# Patient Record
Sex: FEMALE | Race: WHITE
[De-identification: names, ages, dates, MRNs, and addresses within clinical notes are randomized per-mention and may not be internally consistent; named-entity substitution may affect disease eponyms.]

---

## 2017-05-28 ENCOUNTER — HOSPITAL ENCOUNTER (EMERGENCY)
Dept: HOSPITAL 33 - ED | Age: 67
Discharge: HOME | End: 2017-05-28
Payer: MEDICARE

## 2017-05-28 VITALS — SYSTOLIC BLOOD PRESSURE: 135 MMHG | OXYGEN SATURATION: 96 % | DIASTOLIC BLOOD PRESSURE: 69 MMHG

## 2017-05-28 VITALS — HEART RATE: 70 BPM

## 2017-05-28 DIAGNOSIS — T14.8: Primary | ICD-10-CM

## 2017-05-28 LAB
ANION GAP SERPL CALC-SCNC: 12.2 MEQ/L (ref 5–15)
APTT PPP: 33.2 SECONDS (ref 25.3–37)
BASOPHILS NFR BLD AUTO: 0.4 % (ref 0–0.4)
BUN SERPL-MCNC: 19 MG/DL (ref 9–20)
CHLORIDE SERPL-SCNC: 109 MEQ/L (ref 98–107)
CO2 SERPL-SCNC: 26.6 MEQ/L (ref 21–32)
GLUCOSE SERPL-MCNC: 97 MG/DL (ref 70–110)
INR PPP: 0.97 (ref 0.8–3)
MCH RBC QN AUTO: 30.2 PG (ref 26–32)
NEUTROPHILS NFR BLD AUTO: 57.9 % (ref 36–66)
PLATELET # BLD AUTO: 227 K/MM3 (ref 150–450)
POTASSIUM SERPLBLD-SCNC: 4.4 MEQ/L (ref 3.5–5.1)
PROTHROMBIN TIME: 10.9 SECONDS (ref 9.95–12.35)
RBC # BLD AUTO: 4.1 M/MM3 (ref 4.1–5.4)
SODIUM SERPL-SCNC: 143 MEQ/L (ref 136–145)
WBC # BLD AUTO: 5.7 K/MM3 (ref 4–10.5)

## 2017-05-28 PROCEDURE — 36415 COLL VENOUS BLD VENIPUNCTURE: CPT

## 2017-05-28 PROCEDURE — 85025 COMPLETE CBC W/AUTO DIFF WBC: CPT

## 2017-05-28 PROCEDURE — 85610 PROTHROMBIN TIME: CPT

## 2017-05-28 PROCEDURE — 99283 EMERGENCY DEPT VISIT LOW MDM: CPT

## 2017-05-28 PROCEDURE — 80048 BASIC METABOLIC PNL TOTAL CA: CPT

## 2017-05-28 PROCEDURE — 85730 THROMBOPLASTIN TIME PARTIAL: CPT

## 2017-05-28 NOTE — ERPHSYRPT
- History of Present Illness


Time Seen by Provider: 05/28/17 09:29


Exam Limitations: no limitations


Patient Subjective Stated Complaint: while making bed this am noticed a bruise 

on right wrist.  does not remember hitting wrist on anything.


Triage Nursing Assessment: ambulated to room per self without difficulty.  skin 

w/d, color normal.  large hematoma to right wrist.  tender to touch.  pulse good

, hand warm and normal color.


Physician History: 





The patient is a right-handed 66-year-old female with her  complaining 

of pain in her right wrist that began suddenly while she was making the bed 

this morning.  She was pulling on that sheet and felt a sudden pop in her right 

wrist and has noticed a large bruise/hematoma on her wrist.  This is now tender 

and swollen.  She doesn't take any blood thinners.  She's never had any 

bleeding or clotting problems in the past.  Her past medical history 

significant only for high cholesterol and a hysterectomy.


Occurred: just prior to arrival


Method of Injury: other (making the bed)


Quality: sharpness


Severity of Pain-Max: mild


Severity of Pain-Current: mild


Extremities Pain Location: wrist: right


Modifying Factors: Improves With: nothing


Associated Symptoms: none


Allergies/Adverse Reactions: 








Sulfa (Sulfonamide Antibiotics) Allergy (Verified 05/28/17 09:01)


 


codeine Adverse Reaction (Verified 05/28/17 09:01)


 





Home Medications: 








Atorvastatin Calcium [Lipitor] 10 mg PO HS 05/28/17 [History]


Zinc 50 mg PO HS 05/28/17 [History]





Hx Tetanus, Diphtheria Vaccination/Date Given: No


Hx Influenza Vaccination/Date Given: No


Hx Pneumococcal Vaccination/Date Given: No





- Review of Systems


Constitutional: No Fever, No Chills


Eyes: No Symptoms


Ears, Nose, & Throat: No Symptoms


Respiratory: No Cough, No Dyspnea


Cardiac: No Chest Pain, No Edema, No Syncope


Abdominal/Gastrointestinal: No Abdominal Pain, No Nausea, No Vomiting, No 

Diarrhea


Genitourinary Symptoms: No Dysuria


Musculoskeletal: No Back Pain, No Neck Pain


Skin: Other (bruise/hematoma)


Neurological: No Dizziness, No Focal Weakness, No Sensory Changes


Psychological: No Symptoms


Endocrine: No Symptoms


Hematologic/Lymphatic: No Blood Clots, No Easy Bleeding, No Easy Bruising


Immunological/Allergic: No Symptoms


All Other Systems: Reviewed and Negative





- Past Medical History


Pertinent Past Medical History: Yes


Cardiac History: High Cholesterol


Musculoskeletal History: Fibromyalgia





- Past Surgical History


Past Surgical History: Yes


Female Surgical History: Hysterectomy





- Social History


Smoking Status: Never smoker


Exposure to second hand smoke: No


Drug Use: none


Patient Lives Alone: No





- Nursing Vital Signs


Nursing Vital Signs: 


 Initial Vital Signs











Temperature                    99.3 F


 


Temperature Source             Oral


 


Pulse Rate                     70


 


Respiratory Rate               18


 


Blood Pressure [Left Arm]      135/69


 


Pain Intensity                 4

















- Physical Exam


General Appearance: alert


Eyes, Ears, Nose, Throat Exam: moist mucous membranes


Neck Exam: non-tender, supple


Cardiovascular/Respiratory Exam: chest non-tender, normal breath sounds, 

regular rate/rhythm, no respiratory distress


Abdominal Exam: non-tender, No guarding


Back Exam: normal inspection, No vertebral tenderness


Shoulder Exam: normal inspection


Elbow/Forearm Exam: normal inspection


Wrist Exam: ecchymosis (Examination of the right wrist reveals a large hematoma 

over the palmar side of the wrist.  This hematoma has produced mild swelling 

and tenderness.  The patient moves the wrist without difficulty.)


Hand Exam: normal inspection


Neuro/Tendon Exam: normal sensation, normal motor functions


Mental Status Exam: alert, oriented x 3, cooperative


Skin Exam: normal color, warm, dry


**SpO2 Interpretation**: normal


SpO2: 96


Oxygen Delivery: Room Air


Ordered Tests: 


 Active Orders 24 hr











 Category Date Time Status


 


 BMP Stat Lab  05/28/17 09:48 Completed


 


 CBC W DIFF Stat Lab  05/28/17 09:48 Completed


 


 PROTIME WITH INR Stat Lab  05/28/17 09:48 Completed


 


 PTT Stat Lab  05/28/17 09:48 Completed











Lab/Rad Data: 


 Laboratory Result [facility]





 05/28/17 09:48 





 05/28/17 09:48 





 Laboratory Results











  05/28/17 05/28/17 05/28/17 Range/Units





  09:48 09:48 09:48 


 


WBC    5.7  (4.0-10.5)  K/mm3


 


RBC    4.10  (4.1-5.4)  M/mm3


 


Hgb    12.4  (12.0-16.0)  gm/dl


 


Hct    38.9  (35-47)  %


 


MCV    94.9  ()  fl


 


MCH    30.2  (26-32)  pg


 


MCHC    31.9 L  (32-36)  g/dl


 


RDW    13.5  (11.5-14.0)  %


 


Plt Count    227  (150-450)  K/mm3


 


MPV    10.3 H  (6-9.5)  fl


 


Gran %    57.9  (36.0-66.0)  %


 


Lymphocytes %    29.3  (24.0-44.0)  %


 


Monocytes %    9.9  (0.0-12.0)  %


 


Eosinophils %    2.5  (0.00-5.0)  %


 


Basophils %    0.4  (0.0-0.4)  %


 


Basophils #    0.02  (0-0.4)  


 


INR  0.97    (0.8-3.0)  


 


APTT  33.2    (25.3-37.0)  SECONDS


 


Sodium   143   (136-145)  mEq/L


 


Potassium   4.4   (3.5-5.1)  mEq/L


 


Chloride   109 H   ()  mEq/L


 


Carbon Dioxide   26.6   (21-32)  mEq/L


 


Anion Gap   12.2   (5-15)  MEQ/L


 


BUN   19   (9-20)  mg/dL


 


Creatinine   0.97   (0.55-1.30)  mg/dl


 


Estimated GFR   > 60   ML/MIN


 


Glucose   97   ()  MG/DL


 


Calcium   9.3   (8.5-10.1)  mg/dL














- Progress


Progress: unchanged


Counseled pt/family regarding: lab results, diagnosis





- Departure


Time of Disposition: 10:24


Departure Disposition: Home


Clinical Impression: 


 Hematoma





Condition: Stable


Critical Care Time: No


Additional Instructions: 


You have a hematoma on your right wrist.  All laboratory tests were within 

normal limits.  Apply ice to the area today as needed.  If condition worsens, 

please return to the ER or follow-up with your family physician.

## 2021-12-02 ENCOUNTER — HOSPITAL ENCOUNTER (EMERGENCY)
Dept: HOSPITAL 33 - ED | Age: 71
Discharge: LEFT BEFORE DISCHARGE | End: 2021-12-02
Payer: MEDICARE

## 2021-12-02 VITALS — HEART RATE: 64 BPM | OXYGEN SATURATION: 98 % | SYSTOLIC BLOOD PRESSURE: 145 MMHG | DIASTOLIC BLOOD PRESSURE: 79 MMHG

## 2021-12-02 DIAGNOSIS — M54.2: ICD-10-CM

## 2021-12-02 DIAGNOSIS — E78.5: ICD-10-CM

## 2021-12-02 DIAGNOSIS — I24.9: Primary | ICD-10-CM

## 2021-12-02 DIAGNOSIS — M79.602: ICD-10-CM

## 2021-12-02 LAB
ALBUMIN SERPL-MCNC: 3.8 G/DL (ref 3.5–5)
ALP SERPL-CCNC: 77 U/L (ref 38–126)
ALT SERPL-CCNC: 24 U/L (ref 0–35)
ANION GAP SERPL CALC-SCNC: 9.6 MEQ/L (ref 5–15)
AST SERPL QL: 27 U/L (ref 14–36)
BASOPHILS # BLD AUTO: 0.04 10*3/UL (ref 0–0.4)
BASOPHILS NFR BLD AUTO: 0.6 % (ref 0–0.4)
BILIRUB BLD-MCNC: 0.4 MG/DL (ref 0.2–1.3)
BNP SERPL-MCNC: 114 PG/ML (ref 0–900)
BUN SERPL-MCNC: 22 MG/DL (ref 7–17)
CALCIUM SPEC-MCNC: 9.3 MG/DL (ref 8.4–10.2)
CHLORIDE SERPL-SCNC: 107 MMOL/L (ref 98–107)
CO2 SERPL-SCNC: 26 MMOL/L (ref 22–30)
CREAT SERPL-MCNC: 0.85 MG/DL (ref 0.52–1.04)
EOSINOPHIL # BLD AUTO: 0.22 10*3/UL (ref 0–0.5)
GFR SERPLBLD BASED ON 1.73 SQ M-ARVRAT: > 60 ML/MIN
GLUCOSE SERPL-MCNC: 103 MG/DL (ref 74–106)
GLUCOSE UR-MCNC: NEGATIVE MG/DL
HCT VFR BLD AUTO: 39.7 % (ref 35–47)
HGB BLD-MCNC: 12.5 GM/DL (ref 12–16)
LYMPHOCYTES # SPEC AUTO: 2 10*3/UL (ref 1–4.6)
MAGNESIUM SERPL-MCNC: 2.2 MG/DL (ref 1.6–2.3)
MCH RBC QN AUTO: 30.3 PG (ref 26–32)
MCHC RBC AUTO-ENTMCNC: 31.5 G/DL (ref 32–36)
MONOCYTES # BLD AUTO: 0.89 10*3/UL (ref 0–1.3)
PLATELET # BLD AUTO: 228 K/MM3 (ref 150–450)
POTASSIUM SERPLBLD-SCNC: 4.1 MMOL/L (ref 3.5–5.1)
PROT SERPL-MCNC: 6.4 G/DL (ref 6.3–8.2)
PROT UR STRIP-MCNC: NEGATIVE MG/DL
RBC # BLD AUTO: 4.13 M/MM3 (ref 4.1–5.4)
RBC #/AREA URNS HPF: (no result) /HPF (ref 0–2)
SODIUM SERPL-SCNC: 138 MMOL/L (ref 137–145)
WBC # BLD AUTO: 6.9 K/MM3 (ref 4–10.5)
WBC #/AREA URNS HPF: (no result) /HPF (ref 0–5)

## 2021-12-02 PROCEDURE — 99284 EMERGENCY DEPT VISIT MOD MDM: CPT

## 2021-12-02 PROCEDURE — 83880 ASSAY OF NATRIURETIC PEPTIDE: CPT

## 2021-12-02 PROCEDURE — 83735 ASSAY OF MAGNESIUM: CPT

## 2021-12-02 PROCEDURE — 93041 RHYTHM ECG TRACING: CPT

## 2021-12-02 PROCEDURE — 36415 COLL VENOUS BLD VENIPUNCTURE: CPT

## 2021-12-02 PROCEDURE — 94760 N-INVAS EAR/PLS OXIMETRY 1: CPT

## 2021-12-02 PROCEDURE — 93005 ELECTROCARDIOGRAM TRACING: CPT

## 2021-12-02 PROCEDURE — 82947 ASSAY GLUCOSE BLOOD QUANT: CPT

## 2021-12-02 PROCEDURE — 80053 COMPREHEN METABOLIC PANEL: CPT

## 2021-12-02 PROCEDURE — 36000 PLACE NEEDLE IN VEIN: CPT

## 2021-12-02 PROCEDURE — 81001 URINALYSIS AUTO W/SCOPE: CPT

## 2021-12-02 PROCEDURE — 84484 ASSAY OF TROPONIN QUANT: CPT

## 2021-12-02 PROCEDURE — 85025 COMPLETE CBC W/AUTO DIFF WBC: CPT

## 2021-12-02 NOTE — ERPHSYRPT
- History of Present Illness


Time Seen by Provider: 12/02/21 18:00


Source: patient


Exam Limitations: no limitations


Patient Subjective Stated Complaint: PT HERE FOR WEAKNESS SUDDEN ONSET TODAY, 

SHE ASLO STATES ALL DAY SHE HAS BEEN HAVING SHOOTING PAIN UP NECK AND DOWN LEFT 

ARM, STATES PAIN COMES AND GOES,NO INJURY


Triage Nursing Assessment: PT ALERT, RESP EASY, SKIN W/D/P. FACE MASK IN PLACE, 

ABLE TO UNDRESS SELF AND WALKED TO BED, NO EDEMA NOTED


Physician History: 


Patient is a 71-year-old female presents to our ED with complaints of sudden 

onset generalized weakness.  Patient states she was at home when her symptoms 

started.  Patient then felt pain in her neck and into her left arm.  Patient 

thought she was having a heart attack and came to our emergency department.  

Upon arrival to our ED patient symptoms had improved.  Patient states her 

symptoms have been intermittent throughout the day.  Symptoms are mild to 

moderate in intensity.  Exertion worsens symptoms.  Pain improved with rest.  No

associated nausea vomiting or diaphoresis.  Patient states she has had the 

symptoms in the past but were more intense today.  Patient voices no other 

complaints or concerns at this time.





Timing/Duration: today


Severity: moderate


Modifying Factors: Improves With: movement


Associated Symptoms: denies symptoms


Allergies/Adverse Reactions: 








Sulfa (Sulfonamide Antibiotics) Allergy (Verified 12/02/21 18:01)


   


codeine Adverse Reaction (Verified 12/02/21 18:01)


   





Home Medications: 








Atorvastatin Calcium [Lipitor] 10 mg PO HS 05/28/17 [History]


Zinc 50 mg PO HS 05/28/17 [History]





Hx Tetanus, Diphtheria Vaccination/Date Given: No


Hx Influenza Vaccination/Date Given: No


Hx Pneumococcal Vaccination/Date Given: No


Immunizations Up to Date: Yes





Travel Risk





- International Travel


Have you traveled outside of the country in past 3 weeks: No





- Coronavirus Screening


Are you exhibiting any of the following symptoms?: No


Close contact with a COVID-19 positive Pt in past 14-21 Days: No





- Vaccine Status


Have you recieved a Covid-19 vaccination: No





- Review of Systems


Constitutional: No Symptoms, No Fever, No Chills


Eyes: No Symptoms


Ears, Nose, & Throat: No Symptoms


Respiratory: No Symptoms, No Cough, No Dyspnea


Cardiac: No Symptoms, No Chest Pain, No Edema, No Syncope


Abdominal/Gastrointestinal: No Symptoms, No Abdominal Pain, No Nausea, No 

Vomiting, No Diarrhea


Genitourinary Symptoms: No Symptoms, No Dysuria


Musculoskeletal: No Symptoms, No Back Pain, No Neck Pain


Skin: No Symptoms, No Rash


Neurological: No Symptoms, No Dizziness, No Focal Weakness, No Sensory Changes


Psychological: No Symptoms


Endocrine: No Symptoms


Hematologic/Lymphatic: No Symptoms


Immunological/Allergic: No Symptoms


All Other Systems: Reviewed and Negative





- Past Medical History


Pertinent Past Medical History: Yes


Cardiac History: High Cholesterol


Musculoskeletal History: Fibromyalgia





- Past Surgical History


Past Surgical History: Yes


Female Surgical History: Hysterectomy





- Social History


Smoking Status: Never smoker


Exposure to second hand smoke: No


Drug Use: none


Patient Lives Alone: No





- Female History


Hx Last Menstrual Period: POST


Hx Pregnant Now: No





- Nursing Vital Signs


Nursing Vital Signs: 


                               Initial Vital Signs











Temperature  96.8 F   12/02/21 17:54


 


Pulse Rate  60   12/02/21 17:54


 


Respiratory Rate  96 H  12/02/21 17:54


 


Blood Pressure  182/93   12/02/21 17:54


 


O2 Sat by Pulse Oximetry  99   12/02/21 17:54








                                   Pain Scale











Pain Intensity                 4

















- Physical Exam


General Appearance: no apparent distress, alert


Eye Exam: PERRL/EOMI, eyes nml inspection


Ears, Nose, Throat Exam: normal ENT inspection, TMs normal, pharynx normal, 

moist mucous membranes


Neck Exam: normal inspection, non-tender, supple, full range of motion


Respiratory Exam: normal breath sounds, lungs clear, airway intact, No 

respiratory distress


Cardiovascular Exam: regular rate/rhythm, normal heart sounds, normal peripheral

 pulses


Gastrointestinal/Abdomen Exam: soft, normal bowel sounds, No tenderness, No mass


Back Exam: normal inspection, normal range of motion, No CVA tenderness, No 

vertebral tenderness


Extremity Exam: normal inspection, normal range of motion, pelvis stable


Neurologic Exam: alert, oriented x 3, cooperative, normal mood/affect, nml 

cerebellar function, nml station & gait, sensation nml, No motor deficits


Skin Exam: normal color, warm, dry, No rash


Lymphatic Exam: No adenopathy


**SpO2 Interpretation**: normal


SpO2: 100


O2 Delivery: Room Air





- Course


Nursing assessment & vital signs reviewed: Yes


EKG Interpreted by Me: RATE (53), Sinus Rhythm, NORMAL AXIS, NORMAL INTERVALS


Ordered Tests: 


                               Active Orders 24 hr











 Category Date Time Status


 


 AMA [Release AMA] OM.NOW Care  12/02/21 20:30 Ordered


 


 Cardiac Monitor STAT Care  12/02/21 18:11 Active


 


 EKG-ER Only STAT Care  12/02/21 18:10 Active


 


 IV Insertion STAT Care  12/02/21 18:10 Active


 


 Pulse Oximetry (ED) STAT Care  12/02/21 18:10 Active


 


 CBC W DIFF Stat Lab  12/02/21 18:26 Completed


 


 CMP Stat Lab  12/02/21 18:26 Completed


 


 MAGNESIUM Stat Lab  12/02/21 18:26 Completed


 


 NT PRO BNP Stat Lab  12/02/21 18:26 Completed


 


 POCT GLUCOSE Stat Lab  12/02/21 18:12 Completed


 


 TROPONIN Q3H Lab  12/02/21 18:26 Completed


 


 TROPONIN Q3H Lab  12/02/21 21:15 Ordered


 


 TROPONIN Q3H Lab  12/03/21 00:15 Ordered


 


 TROPONIN Q3H Lab  12/03/21 03:15 Ordered


 


 TROPONIN Q3H Lab  12/03/21 06:15 Ordered


 


 UA W/RFX UR CULTURE Stat Lab  12/02/21 18:14 Completed











Lab/Rad Data: 


                           Laboratory Result Diagrams





                                 12/02/21 18:26 





                                 12/02/21 18:26 





                               Laboratory Results











  12/02/21 12/02/21 12/02/21 Range/Units





  18:26 18:26 18:26 


 


WBC    6.9  (4.0-10.5)  K/mm3


 


RBC    4.13  (4.1-5.4)  M/mm3


 


Hgb    12.5  (12.0-16.0)  gm/dl


 


Hct    39.7  (35-47)  %


 


MCV    96.1  ()  fl


 


MCH    30.3  (26-32)  pg


 


MCHC    31.5 L  (32-36)  g/dl


 


RDW    13.1  (11.5-14.0)  %


 


Plt Count    228  (150-450)  K/mm3


 


MPV    10.2  (7.5-11.0)  fl


 


Gran %    54.4  (36.0-66.0)  %


 


Eos # (Auto)    0.22  (0-0.5)  


 


Absolute Lymphs (auto)    2.00  (1.0-4.6)  


 


Absolute Monos (auto)    0.89  (0.0-1.3)  


 


Lymphocytes %    28.9  (24.0-44.0)  %


 


Monocytes %    12.9 H  (0.0-12.0)  %


 


Eosinophils %    3.2  (0.00-5.0)  %


 


Basophils %    0.6  (0.0-0.4)  %


 


Absolute Granulocytes    3.76  (1.4-6.9)  


 


Basophils #    0.04  (0-0.4)  


 


Sodium   138   (137-145)  mmol/L


 


Potassium   4.1   (3.5-5.1)  mmol/L


 


Chloride   107   ()  mmol/L


 


Carbon Dioxide   26   (22-30)  mmol/L


 


Anion Gap   9.6   (5-15)  MEQ/L


 


BUN   22 H   (7-17)  mg/dL


 


Creatinine   0.85   (0.52-1.04)  mg/dL


 


Estimated GFR   > 60.0   ML/MIN


 


Glucose   103   ()  mg/dL


 


POC Glucometer     (74 to 106)  mg/dL


 


Calcium   9.3   (8.4-10.2)  mg/dL


 


Magnesium   2.2   (1.6-2.3)  mg/dL


 


Total Bilirubin   0.40   (0.2-1.3)  mg/dL


 


AST   27   (14-36)  U/L


 


ALT   24   (0-35)  U/L


 


Alkaline Phosphatase   77   ()  U/L


 


Troponin I  < 0.012    (0.000-0.034)  ng/mL


 


NT-Pro-B Natriuret Pep   114   (0-900)  pg/mL


 


Serum Total Protein   6.4   (6.3-8.2)  g/dL


 


Albumin   3.8   (3.5-5.0)  g/dL


 


Urine Color     (YELLOW)  


 


Urine Appearance     (CLEAR)  


 


Urine pH     (5-6)  


 


Ur Specific Gravity     (1.005-1.025)  


 


Urine Protein     (Negative)  


 


Urine Ketones     (NEGATIVE)  


 


Urine Blood     (0-5)  Tommy/ul


 


Urine Nitrite     (NEGATIVE)  


 


Urine Bilirubin     (NEGATIVE)  


 


Urine Urobilinogen     (0-1)  mg/dL


 


Ur Leukocyte Esterase     (NEGATIVE)  


 


Urine WBC (Auto)     (0-5)  /HPF


 


Urine RBC (Auto)     (0-2)  /HPF


 


U Epithel Cells (Auto)     (FEW)  /HPF


 


Urine Bacteria (Auto)     (NEGATIVE)  /HPF


 


Urine Culture Reflexed     (NO)  


 


Urine Glucose     (NEGATIVE)  mg/dL














  12/02/21 12/02/21 Range/Units





  18:14 18:12 


 


WBC    (4.0-10.5)  K/mm3


 


RBC    (4.1-5.4)  M/mm3


 


Hgb    (12.0-16.0)  gm/dl


 


Hct    (35-47)  %


 


MCV    ()  fl


 


MCH    (26-32)  pg


 


MCHC    (32-36)  g/dl


 


RDW    (11.5-14.0)  %


 


Plt Count    (150-450)  K/mm3


 


MPV    (7.5-11.0)  fl


 


Gran %    (36.0-66.0)  %


 


Eos # (Auto)    (0-0.5)  


 


Absolute Lymphs (auto)    (1.0-4.6)  


 


Absolute Monos (auto)    (0.0-1.3)  


 


Lymphocytes %    (24.0-44.0)  %


 


Monocytes %    (0.0-12.0)  %


 


Eosinophils %    (0.00-5.0)  %


 


Basophils %    (0.0-0.4)  %


 


Absolute Granulocytes    (1.4-6.9)  


 


Basophils #    (0-0.4)  


 


Sodium    (137-145)  mmol/L


 


Potassium    (3.5-5.1)  mmol/L


 


Chloride    ()  mmol/L


 


Carbon Dioxide    (22-30)  mmol/L


 


Anion Gap    (5-15)  MEQ/L


 


BUN    (7-17)  mg/dL


 


Creatinine    (0.52-1.04)  mg/dL


 


Estimated GFR    ML/MIN


 


Glucose    ()  mg/dL


 


POC Glucometer   90  (74 to 106)  mg/dL


 


Calcium    (8.4-10.2)  mg/dL


 


Magnesium    (1.6-2.3)  mg/dL


 


Total Bilirubin    (0.2-1.3)  mg/dL


 


AST    (14-36)  U/L


 


ALT    (0-35)  U/L


 


Alkaline Phosphatase    ()  U/L


 


Troponin I    (0.000-0.034)  ng/mL


 


NT-Pro-B Natriuret Pep    (0-900)  pg/mL


 


Serum Total Protein    (6.3-8.2)  g/dL


 


Albumin    (3.5-5.0)  g/dL


 


Urine Color  STRAW   (YELLOW)  


 


Urine Appearance  CLEAR   (CLEAR)  


 


Urine pH  5.0   (5-6)  


 


Ur Specific Gravity  1.014   (1.005-1.025)  


 


Urine Protein  NEGATIVE   (Negative)  


 


Urine Ketones  NEGATIVE   (NEGATIVE)  


 


Urine Blood  SMALL   (0-5)  Tommy/ul


 


Urine Nitrite  NEGATIVE   (NEGATIVE)  


 


Urine Bilirubin  NEGATIVE   (NEGATIVE)  


 


Urine Urobilinogen  NEGATIVE   (0-1)  mg/dL


 


Ur Leukocyte Esterase  TRACE   (NEGATIVE)  


 


Urine WBC (Auto)  3-5   (0-5)  /HPF


 


Urine RBC (Auto)  NONE   (0-2)  /HPF


 


U Epithel Cells (Auto)  NONE   (FEW)  /HPF


 


Urine Bacteria (Auto)  NONE   (NEGATIVE)  /HPF


 


Urine Culture Reflexed  NO   (NO)  


 


Urine Glucose  NEGATIVE   (NEGATIVE)  mg/dL














- Progress


Progress: improved


Progress Note: 


Patient reassessed.  She is asymptomatic.  Preliminary work-up negative.  In 

light of patient's age and symptoms we are concerned for acute coronary 

syndrome.  We advised hospitalization for serial cardiac enzymes and rule out.  

Patient declined.  Patient states that she feels well and wants to go home.





Patient is of sound mind.  Patient is appropriate to make informed and 

independent medical decisions.  Patient understands that leaving AGAINST MEDICAL

ADVICE can result in delayed diagnosis, increased risk of morbidity, mortality, 

short and long-term disability including death.  In spite of these risks, 

patient has decided to leave AGAINST MEDICAL ADVICE.  Patient understands that 

she may return to our ED at any point if she reconsiders.  Patient agrees to 

follow-up with her primary care doctor within 48 hours for reevaluation.  

Patient voices no other complaints or concerns at this time.  We will release 

patient AGAINST MEDICAL ADVICE per their request.


12/02/21 20:27








Portions of this note were created with voice recognition technology.  There may

be grammatical, spelling, punctuation or sound alike errors


Counseled pt/family regarding: lab results, diagnosis, need for follow-up





- Departure


Departure Disposition: AMA


Clinical Impression: 


 ACS (acute coronary syndrome)





Condition: Stable


Critical Care Time: No


Referrals: 


SILVA PARRA [Primary Care Provider] - Follow up/PCP as directed


Additional Instructions: 


Discharge/Care Plan





AMRIK MARTINEZ was seen on 12/02/21 in the Emergency Room. The patient was 

counseled regarding Diagnosis,Lab results, Imaging studies, need for follow up 

and when to return to the Emergency Room.





Prescriptions given:





Discharge Note





I have spoken with the patient and/or caregivers. I have explained the patient's

condition, diagnosis and treatment plan based on the information available to me

at this time. I have answered the patient's and/or caregiver's questions and 

addressed any concerns. The patient and/or caregivers have as good understanding

of the patient's diagnosis, condition and treatment plan as can be expected at 

this point. The vital signs have been stable. The patient's condition is stable 

and appropriate for discharge from the emergency department.





The patient will pursue further outpatient evaluation with the primary care 

physician or other designated or consulting physician as outlined in the 

discharge instructions. The patient and/or caregivers are agreeable to this plan

of care and follow-up instructions have been explained in detail. The patient 

and/or caregivers have received these instruction. The patient/and or caregivers

are aware that any significant change in condition or worsening of symptoms 

should prompt an immediate return to this or the closest emergency department or

call 911.

## 2021-12-03 ENCOUNTER — HOSPITAL ENCOUNTER (EMERGENCY)
Dept: HOSPITAL 33 - ED | Age: 71
Discharge: HOME | End: 2021-12-03
Payer: MEDICARE

## 2021-12-03 VITALS — SYSTOLIC BLOOD PRESSURE: 130 MMHG | HEART RATE: 76 BPM | DIASTOLIC BLOOD PRESSURE: 84 MMHG

## 2021-12-03 VITALS — OXYGEN SATURATION: 99 %

## 2021-12-03 DIAGNOSIS — E78.5: ICD-10-CM

## 2021-12-03 DIAGNOSIS — R11.0: ICD-10-CM

## 2021-12-03 DIAGNOSIS — R06.02: ICD-10-CM

## 2021-12-03 DIAGNOSIS — R07.9: Primary | ICD-10-CM

## 2021-12-03 LAB
ALBUMIN SERPL-MCNC: 4.4 G/DL (ref 3.5–5)
ALP SERPL-CCNC: 79 U/L (ref 38–126)
ALT SERPL-CCNC: 25 U/L (ref 0–35)
ANION GAP SERPL CALC-SCNC: 11.2 MEQ/L (ref 5–15)
APTT PPP: 30.8 SECONDS (ref 25.1–36.5)
AST SERPL QL: 26 U/L (ref 14–36)
BASOPHILS # BLD AUTO: 0.03 10*3/UL (ref 0–0.4)
BASOPHILS NFR BLD AUTO: 0.4 % (ref 0–0.4)
BILIRUB BLD-MCNC: 0.5 MG/DL (ref 0.2–1.3)
BNP SERPL-MCNC: 159 PG/ML (ref 0–900)
BUN SERPL-MCNC: 16 MG/DL (ref 7–17)
CALCIUM SPEC-MCNC: 9.5 MG/DL (ref 8.4–10.2)
CHLORIDE SERPL-SCNC: 108 MMOL/L (ref 98–107)
CO2 SERPL-SCNC: 26 MMOL/L (ref 22–30)
CREAT SERPL-MCNC: 0.79 MG/DL (ref 0.52–1.04)
D DIMER BLD IA.RAPID-MCNC: 363 NG/ML (ref 215–500)
EOSINOPHIL # BLD AUTO: 0.21 10*3/UL (ref 0–0.5)
GFR SERPLBLD BASED ON 1.73 SQ M-ARVRAT: > 60 ML/MIN
GLUCOSE SERPL-MCNC: 104 MG/DL (ref 74–106)
HCT VFR BLD AUTO: 44 % (ref 35–47)
HGB BLD-MCNC: 14 GM/DL (ref 12–16)
INR PPP: 0.93 (ref 0.8–3)
LYMPHOCYTES # SPEC AUTO: 2.13 10*3/UL (ref 1–4.6)
MCH RBC QN AUTO: 30.4 PG (ref 26–32)
MCHC RBC AUTO-ENTMCNC: 31.8 G/DL (ref 32–36)
MONOCYTES # BLD AUTO: 0.83 10*3/UL (ref 0–1.3)
PLATELET # BLD AUTO: 238 K/MM3 (ref 150–450)
POTASSIUM SERPLBLD-SCNC: 4.1 MMOL/L (ref 3.5–5.1)
PROT SERPL-MCNC: 7 G/DL (ref 6.3–8.2)
PROTHROMBIN TIME: 11 SECONDS (ref 9.4–12.5)
RBC # BLD AUTO: 4.61 M/MM3 (ref 4.1–5.4)
SODIUM SERPL-SCNC: 141 MMOL/L (ref 137–145)
WBC # BLD AUTO: 7.1 K/MM3 (ref 4–10.5)

## 2021-12-03 PROCEDURE — 36000 PLACE NEEDLE IN VEIN: CPT

## 2021-12-03 PROCEDURE — 93041 RHYTHM ECG TRACING: CPT

## 2021-12-03 PROCEDURE — 83880 ASSAY OF NATRIURETIC PEPTIDE: CPT

## 2021-12-03 PROCEDURE — 93005 ELECTROCARDIOGRAM TRACING: CPT

## 2021-12-03 PROCEDURE — 71045 X-RAY EXAM CHEST 1 VIEW: CPT

## 2021-12-03 PROCEDURE — 85610 PROTHROMBIN TIME: CPT

## 2021-12-03 PROCEDURE — 85730 THROMBOPLASTIN TIME PARTIAL: CPT

## 2021-12-03 PROCEDURE — 84484 ASSAY OF TROPONIN QUANT: CPT

## 2021-12-03 PROCEDURE — 36415 COLL VENOUS BLD VENIPUNCTURE: CPT

## 2021-12-03 PROCEDURE — 85025 COMPLETE CBC W/AUTO DIFF WBC: CPT

## 2021-12-03 PROCEDURE — 85379 FIBRIN DEGRADATION QUANT: CPT

## 2021-12-03 PROCEDURE — 71275 CT ANGIOGRAPHY CHEST: CPT

## 2021-12-03 PROCEDURE — 80053 COMPREHEN METABOLIC PANEL: CPT

## 2021-12-03 PROCEDURE — 99284 EMERGENCY DEPT VISIT MOD MDM: CPT

## 2021-12-03 NOTE — XRAY
Exam: AP upright portable chest film from 12/3/2021.



Comparison: None.



Indication: 71-year-old female with chest pain; weakness.



Findings: The heart size and contour are normal.  The deb and mediastinal

structures appear unremarkable.  The lungs are well inflated.  No air space

infiltrates, vascular congestion, pneumothorax, or pleural fluid is seen.  No

interstitial lung changes are seen.  No acute osseous process is seen.  EKG

leads are noted in place.



Impression:



1.  No acute cardiopulmonary disease seen.

## 2021-12-03 NOTE — XRAY
Exam: CTA of the chest with IV contrast from 12/3/2021.

            Total exam DLP: 602.90 mGy-cm



Comparison: AP upright portable chest film from 12/3/2021.



Indication: 71-year-old female with chest pain, shortness of breath, and

weakness for a couple days.  Rule out PE/aortic dissection.



Technique: Post-IV contrast axial images were obtained through the chest

employing 100 cc of Isovue-370 contrast using the PE protocol.  In addition,

3-D volume rendered images of the thoracic aorta were created at the CT

workstation.



Findings: The major lobar and segmental pulmonary arteries are well-opacified

with contrast and reveal no filling defects to suggest pulmonary emboli.  The

heart size is normal without pericardial effusion.  The thoracic aorta reveals

some vascular calcification within the aortic arch.  No thoracic aortic

aneurysm or dissection is seen.  The proximal portion of the major arteries

arising from the aortic arch appear unremarkable.  I believe there is some

mild coronary artery vascular calcification within the LAD and circumflex

artery on the left.  Correlate clinically.  No mediastinal mass or abnormal

lymphadenopathy seen.  The thyroid gland is only partially seen on this exam.



The peripheral lungs reveals some minimal linear scarring/atelectasis at both

posterior lung bases.  No air space infiltrates, suspicious soft tissue lung

nodularity, pneumothorax, or pleural fluid is seen.  The trachea and major

central branching bronchi appear open on the lung window images.



The skeleton reveals no acute fracture or aggressive bone lesion.  I do note

mild/moderate mid and lower thoracic spondylosis.  There is a prominent

posterior bridging osteophyte at T8-T9 which mildly impinges upon the anterior

aspect of the spinal canal at this level.  See sagittal image #97 and thin

axial images #61 through #74.  Correlate clinically.



Impression:



1.  I see no evidence of acute pulmonary embolism.  Neither do I see evidence

of thoracic aortic aneurysm or thoracic aortic dissection.



2.  Mild left coronary vascular calcification is seen.



3.  No other acute cardiopulmonary process is seen.  I do note some minimal

linear scarring/plate atelectasis at both posterior lung bases.



4.  There is a mild to moderate sized posterior bridging osteophyte at T8-T9

causing some mild compromise upon the anterior aspect of the spinal sac at

this level.  Correlate clinically.  I also see evidence of mild multilevel

degenerative disc disease within the mid and lower thoracic spine.  A small

Schmorl's node is seen within the superior vertebral endplate of both T10 and

T11.

## 2021-12-03 NOTE — ERPHSYRPT
- History of Present Illness


Historian: patient


Exam Limitations: no limitations


Patient Subjective Stated Complaint: chest pain/discomfort for 2 days, pt was in

this ER last night and everything was clear and she followed up with Dr. Rendon 

today and wanted to direct admit her but she had to come to the ER to have 

another work up done


Triage Nursing Assessment: Pt brought to the ER by her , hypertensive, 

rates pain as 4/10, pain less today than yesterday, pulses normal, skin n/w/d, 

no difficulties with breathing, doesn't appear to be in any distress


Physician History: 





72 yo wf w sub-sternal chest pain x 2 days. Pt seen and released yesterday from 

ER. Pain is 4/10 but has been up to 10/10. She was sent from PCP's office to be 

admitted. Pain does not radiate today but went up back of neck yesterday. She 

has had nausea/dypnea/diaphoresis wo vomiting. Pt has a h/o hyperlipidemia but 

denies HTN/DM/Tobacco use/hx of CAD. Cough/fever denied.


Timing/Duration: other (2 days)


Quality: pressure, tightness


Location: substernal


Chest Pain Radiation: no radiation


Severity of Pain-Max: moderate


Severity of Pain-Current: mild


Modifying Factors: Improves With: nothing


Associated Symptoms: nausea, shortness of breath, No vomiting, No palpitations, 

No heartburn, No abdominal pain, No cough, No hurts to breathe, No diaphoresis, 

No chills, No fever, No fatigue, No weakness, No swelling/lump in chest, No 

syncope, No rash, No headache, No dizziness, No edema, No back pain


Prior Chest Pain/Cardiac Workup: recently seen/treated (ER visit yesterday)


Nitro Today/Relief: no nitro taken today


Aspirin Treatment Today: no aspirin today


Allergies/Adverse Reactions: 








Sulfa (Sulfonamide Antibiotics) Allergy (Verified 12/03/21 15:45)


   


codeine Adverse Reaction (Verified 12/03/21 15:45)


   





Home Medications: 








Atorvastatin Calcium [Lipitor] 10 mg PO HS 05/28/17 [History]


Zinc 50 mg PO HS 05/28/17 [History]





Hx Tetanus, Diphtheria Vaccination/Date Given: No


Hx Influenza Vaccination/Date Given: No


Hx Pneumococcal Vaccination/Date Given: No


Immunizations Up to Date: No





Travel Risk





- International Travel


Have you traveled outside of the country in past 3 weeks: No





- Coronavirus Screening


Are you exhibiting any of the following symptoms?: No


Close contact with a COVID-19 positive Pt in past 14-21 Days: No





- Vaccine Status


Have you recieved a Covid-19 vaccination: No





- Review of Systems


Constitutional: No Symptoms


Eyes: No Symptoms


Ears, Nose, & Throat: No Symptoms


Respiratory: No Symptoms, Dyspnea


Cardiac: No Symptoms, Chest Pain


Abdominal/Gastrointestinal: No Symptoms, Nausea


Genitourinary Symptoms: No Symptoms


Musculoskeletal: No Symptoms


Skin: No Symptoms


Neurological: No Symptoms


Psychological: No Symptoms


Endocrine: No Symptoms


Hematologic/Lymphatic: No Symptoms


Immunological/Allergic: No Symptoms





- Past Medical History


Pertinent Past Medical History: Yes


Neurological History: No Pertinent History


ENT History: No Pertinent History


Cardiac History: High Cholesterol


Respiratory History: No Pertinent History


Endocrine Medical History: No Pertinent History


Musculoskeletal History: Fibromyalgia


GI Medical History: No Pertinent History


 History: No Pertinent History


Psycho-Social History: No Pertinent History


Female Reproductive Disorders: No Pertinent History





- Past Surgical History


Past Surgical History: Yes


Female Surgical History: Hysterectomy, Tubal Ligation





- Social History


Smoking Status: Never smoker


Exposure to second hand smoke: No


Drug Use: none


Patient Lives Alone: No





- Nursing Vital Signs


Nursing Vital Signs: 


                               Initial Vital Signs











Temperature  97.5 F   12/03/21 15:27


 


Pulse Rate  61   12/03/21 15:27


 


Respiratory Rate  19   12/03/21 15:27


 


Blood Pressure  171/91   12/03/21 15:27


 


O2 Sat by Pulse Oximetry  99   12/03/21 15:27








                                   Pain Scale











Pain Intensity                 4











Hypertensive





- Physical Exam


General Appearance: no apparent distress


Eye Exam: PERRL/EOMI, eyes nml inspection


Ears, Nose, Throat Exam: normal ENT inspection, TMs normal, pharynx normal, 

moist mucous membranes


Neck Exam: normal inspection, non-tender, supple, full range of motion, No m

eningismus, No mass, No Brudzinski, No Kernig's, No carotid bruit


Respiratory Exam: normal breath sounds, lungs clear, airway intact


Cardiovascular Exam: regular rate/rhythm, normal heart sounds, No murmur


Gastrointestinal/Abdomen Exam: soft, normal bowel sounds, No tenderness


Back Exam: normal inspection, normal range of motion, No CVA tenderness, No 

vertebral tenderness


Extremity Exam: normal inspection, normal range of motion


Neurologic Exam: alert, oriented x 3, cooperative, CNs II-XII nml as tested, 

normal mood/affect, nml cerebellar function, nml station & gait, sensation nml


Skin Exam: normal color


Lymphatic Exam: No adenopathy


**SpO2 Interpretation**: normal


SpO2: 99


O2 Delivery: Room Air





- Course


EKG Interpreted by Me: RATE (Sinus lev/Rate 56/normal QT-QTc/Low voltage/No 

acute ST segment changes)





- Radiology Exams


  ** Chest


X-ray Interpretation: Discussed w/ radiologist (NAD)





- CT Exams


  ** Chest


CT Interpretation: Discussed w/radiologist (CTA chest-no PE or AD)


Ordered Tests: 


                               Active Orders 24 hr











 Category Date Time Status


 


 Cardiac Monitor STAT Care  12/03/21 15:30 Completed


 


 EKG-ER Only STAT Care  12/03/21 15:30 Completed


 


 IV Insertion STAT Care  12/03/21 15:30 Completed


 


 CHEST 1 VIEW (PORTABLE) Stat Exams  12/03/21 15:30 Completed


 


 CTA CHEST W AND/OR WO [CT] Stat Exams  12/03/21 15:49 Completed


 


 CBC W DIFF Stat Lab  12/03/21 15:30 Completed


 


 CMP Stat Lab  12/03/21 15:45 Completed


 


 D-DIMER QUANTITATIVE Stat Lab  12/03/21 15:45 Completed


 


 NT PRO BNP Stat Lab  12/03/21 15:45 Completed


 


 PROTIME WITH INR Stat Lab  12/03/21 15:45 Completed


 


 PTT Stat Lab  12/03/21 15:45 Completed


 


 TROPONIN Q3H Lab  12/03/21 15:45 Completed








Medication Summary














Discontinued Medications














Generic Name Dose Route Start Last Admin





  Trade Name Freq  PRN Reason Stop Dose Admin


 


Aspirin  324 mg  12/03/21 15:36  12/03/21 15:47





  Aspirin 81 Mg Tab.Chew  PO  12/03/21 15:37  324 mg





  STAT ONE   Administration


 


Aspirin  Confirm  12/03/21 15:45 





  Aspirin 81 Mg Tab.Chew  Administered  12/03/21 15:46 





  Dose  





  324 mg  





  .ROUTE  





  .STK-MED ONE  











Lab/Rad Data: 


                           Laboratory Result Diagrams





                                 12/03/21 15:30 





                                 12/03/21 15:45 





                               Laboratory Results











  12/03/21 12/03/21 12/03/21 Range/Units





  15:45 15:45 15:45 


 


WBC     (4.0-10.5)  K/mm3


 


RBC     (4.1-5.4)  M/mm3


 


Hgb     (12.0-16.0)  gm/dl


 


Hct     (35-47)  %


 


MCV     ()  fl


 


MCH     (26-32)  pg


 


MCHC     (32-36)  g/dl


 


RDW     (11.5-14.0)  %


 


Plt Count     (150-450)  K/mm3


 


MPV     (7.5-11.0)  fl


 


Gran %     (36.0-66.0)  %


 


Eos # (Auto)     (0-0.5)  


 


Absolute Lymphs (auto)     (1.0-4.6)  


 


Absolute Monos (auto)     (0.0-1.3)  


 


Lymphocytes %     (24.0-44.0)  %


 


Monocytes %     (0.0-12.0)  %


 


Eosinophils %     (0.00-5.0)  %


 


Basophils %     (0.0-0.4)  %


 


Absolute Granulocytes     (1.4-6.9)  


 


Basophils #     (0-0.4)  


 


PT  11.0    (9.4-12.5)  SECONDS


 


INR  0.93    (0.8-3.0)  


 


APTT  30.8    (25.1-36.5)  SECONDS


 


D-Dimer  363    (215-500)  ng/mL


 


Sodium   141   (137-145)  mmol/L


 


Potassium   4.1   (3.5-5.1)  mmol/L


 


Chloride   108 H   ()  mmol/L


 


Carbon Dioxide   26   (22-30)  mmol/L


 


Anion Gap   11.2   (5-15)  MEQ/L


 


BUN   16   (7-17)  mg/dL


 


Creatinine   0.79   (0.52-1.04)  mg/dL


 


Estimated GFR   > 60.0   ML/MIN


 


Glucose   104   ()  mg/dL


 


Calcium   9.5   (8.4-10.2)  mg/dL


 


Total Bilirubin   0.50   (0.2-1.3)  mg/dL


 


AST   26   (14-36)  U/L


 


ALT   25   (0-35)  U/L


 


Alkaline Phosphatase   79   ()  U/L


 


Troponin I    < 0.012  (0.000-0.034)  ng/mL


 


NT-Pro-B Natriuret Pep   159   (0-900)  pg/mL


 


Serum Total Protein   7.0   (6.3-8.2)  g/dL


 


Albumin   4.4   (3.5-5.0)  g/dL














  12/03/21 Range/Units





  15:30 


 


WBC  7.1  (4.0-10.5)  K/mm3


 


RBC  4.61  (4.1-5.4)  M/mm3


 


Hgb  14.0  (12.0-16.0)  gm/dl


 


Hct  44.0  (35-47)  %


 


MCV  95.4  ()  fl


 


MCH  30.4  (26-32)  pg


 


MCHC  31.8 L  (32-36)  g/dl


 


RDW  13.2  (11.5-14.0)  %


 


Plt Count  238  (150-450)  K/mm3


 


MPV  10.5  (7.5-11.0)  fl


 


Gran %  54.8  (36.0-66.0)  %


 


Eos # (Auto)  0.21  (0-0.5)  


 


Absolute Lymphs (auto)  2.13  (1.0-4.6)  


 


Absolute Monos (auto)  0.83  (0.0-1.3)  


 


Lymphocytes %  30.1  (24.0-44.0)  %


 


Monocytes %  11.7  (0.0-12.0)  %


 


Eosinophils %  3.0  (0.00-5.0)  %


 


Basophils %  0.4  (0.0-0.4)  %


 


Absolute Granulocytes  3.87  (1.4-6.9)  


 


Basophils #  0.03  (0-0.4)  


 


PT   (9.4-12.5)  SECONDS


 


INR   (0.8-3.0)  


 


APTT   (25.1-36.5)  SECONDS


 


D-Dimer   (215-500)  ng/mL


 


Sodium   (137-145)  mmol/L


 


Potassium   (3.5-5.1)  mmol/L


 


Chloride   ()  mmol/L


 


Carbon Dioxide   (22-30)  mmol/L


 


Anion Gap   (5-15)  MEQ/L


 


BUN   (7-17)  mg/dL


 


Creatinine   (0.52-1.04)  mg/dL


 


Estimated GFR   ML/MIN


 


Glucose   ()  mg/dL


 


Calcium   (8.4-10.2)  mg/dL


 


Total Bilirubin   (0.2-1.3)  mg/dL


 


AST   (14-36)  U/L


 


ALT   (0-35)  U/L


 


Alkaline Phosphatase   ()  U/L


 


Troponin I   (0.000-0.034)  ng/mL


 


NT-Pro-B Natriuret Pep   (0-900)  pg/mL


 


Serum Total Protein   (6.3-8.2)  g/dL


 


Albumin   (3.5-5.0)  g/dL














- Progress


Progress: improved


Progress Note: 





12/03/21 17:39


324 ASA po


Heart Score3/Trop neg x2 within 24 hours


Spoke w Dr. Rendon x2, wants to send pt home w outpt stress test and ECHO which 

was set up


12/03/21 21:27





Counseled pt/family regarding: lab results, diagnosis, need for follow-up, rad 

results





- Departure


Departure Disposition: Home


Clinical Impression: 


 Chest pain





Condition: Stable


Critical Care Time: No


Referrals: 


SILVA PARRA [Primary Care Provider] - Follow up/PCP as directed


Instructions:  Chest Pain (DC)


Additional Instructions: 


Stress Test 12/9/21 at 6:30AM


ECHO 12/9/21 at 9:00AM


Return to ER for worsening or sustained chest pain or increasing shortness of 

breath


Take a regular Aspirin once a day(325mg)


Nitroglycerin as needed


Prescriptions: 


Nitroglycerin 0.4 mg Tablet*** [Nitrostat 0.4 MG Tablet***] 0.4 mg SL Q5MIN PRN 

MR X 3 PRN #14


 PRN Reason: Chest Pain